# Patient Record
(demographics unavailable — no encounter records)

---

## 2024-10-18 NOTE — HISTORY OF PRESENT ILLNESS
[Parents] : parents [Normal] : Normal [Toothpaste] : Primary Fluoride Source: Toothpaste [No] : No cigarette smoke exposure [Yes] : At  exposure [Smoke Detectors] : Smoke detectors [Carbon Monoxide Detectors] : Carbon monoxide detectors [NO] : No [FreeTextEntry7] : 2 year old for well exam, tends to get intermittent eczema that flares up would like to see derm  [de-identified] : eats well [LastFluorideTreatment] : this summer

## 2024-10-18 NOTE — HISTORY OF PRESENT ILLNESS
[Parents] : parents [Normal] : Normal [Toothpaste] : Primary Fluoride Source: Toothpaste [No] : No cigarette smoke exposure [Yes] : At  exposure [Smoke Detectors] : Smoke detectors [Carbon Monoxide Detectors] : Carbon monoxide detectors [NO] : No [FreeTextEntry7] : 2 year old for well exam, tends to get intermittent eczema that flares up would like to see derm  [de-identified] : eats well [LastFluorideTreatment] : this summer

## 2024-10-18 NOTE — PHYSICAL EXAM
[Alert] : alert [No Acute Distress] : no acute distress [Normocephalic] : normocephalic [Anterior [place] Closed] : anterior fontanelle closed [Red Reflex Bilateral] : red reflex bilateral [PERRL] : PERRL [Normally Placed Ears] : normally placed ears [Auricles Well Formed] : auricles well formed [Clear Tympanic membranes with present light reflex and bony landmarks] : clear tympanic membranes with present light reflex and bony landmarks [No Discharge] : no discharge [Nares Patent] : nares patent [Palate Intact] : palate intact [Uvula Midline] : uvula midline [Tooth Eruption] : tooth eruption  [Supple, full passive range of motion] : supple, full passive range of motion [No Palpable Masses] : no palpable masses [Symmetric Chest Rise] : symmetric chest rise [Clear to Auscultation Bilaterally] : clear to auscultation bilaterally [Regular Rate and Rhythm] : regular rate and rhythm [S1, S2 present] : S1, S2 present [No Murmurs] : no murmurs [+2 Femoral Pulses] : +2 femoral pulses [Soft] : soft [NonTender] : non tender [Non Distended] : non distended [Normoactive Bowel Sounds] : normoactive bowel sounds [No Hepatomegaly] : no hepatomegaly [No Splenomegaly] : no splenomegaly [Jared 1] : Jared 1 [No Clitoromegaly] : no clitoromegaly [Normal Vaginal Introitus] : normal vaginal introitus [Patent] : patent [Normally Placed] : normally placed [No Abnormal Lymph Nodes Palpated] : no abnormal lymph nodes palpated [No Clavicular Crepitus] : no clavicular crepitus [Symmetric Buttocks Creases] : symmetric buttocks creases [No Spinal Dimple] : no spinal dimple [NoTuft of Hair] : no tuft of hair [Cranial Nerves Grossly Intact] : cranial nerves grossly intact [No Rash or Lesions] : no rash or lesions [de-identified] : dry patches

## 2024-10-18 NOTE — HISTORY OF PRESENT ILLNESS
[Parents] : parents [Normal] : Normal [Toothpaste] : Primary Fluoride Source: Toothpaste [No] : No cigarette smoke exposure [Yes] : At  exposure [Smoke Detectors] : Smoke detectors [Carbon Monoxide Detectors] : Carbon monoxide detectors [NO] : No [FreeTextEntry7] : 2 year old for well exam, tends to get intermittent eczema that flares up would like to see derm  [de-identified] : eats well [LastFluorideTreatment] : this summer

## 2024-10-18 NOTE — PHYSICAL EXAM
[Alert] : alert [No Acute Distress] : no acute distress [Normocephalic] : normocephalic [Anterior [place] Closed] : anterior fontanelle closed [Red Reflex Bilateral] : red reflex bilateral [PERRL] : PERRL [Normally Placed Ears] : normally placed ears [Auricles Well Formed] : auricles well formed [Clear Tympanic membranes with present light reflex and bony landmarks] : clear tympanic membranes with present light reflex and bony landmarks [No Discharge] : no discharge [Nares Patent] : nares patent [Palate Intact] : palate intact [Uvula Midline] : uvula midline [Tooth Eruption] : tooth eruption  [Supple, full passive range of motion] : supple, full passive range of motion [No Palpable Masses] : no palpable masses [Symmetric Chest Rise] : symmetric chest rise [Clear to Auscultation Bilaterally] : clear to auscultation bilaterally [Regular Rate and Rhythm] : regular rate and rhythm [S1, S2 present] : S1, S2 present [No Murmurs] : no murmurs [+2 Femoral Pulses] : +2 femoral pulses [Soft] : soft [NonTender] : non tender [Non Distended] : non distended [Normoactive Bowel Sounds] : normoactive bowel sounds [No Hepatomegaly] : no hepatomegaly [No Splenomegaly] : no splenomegaly [Jared 1] : Jared 1 [No Clitoromegaly] : no clitoromegaly [Normal Vaginal Introitus] : normal vaginal introitus [Patent] : patent [Normally Placed] : normally placed [No Abnormal Lymph Nodes Palpated] : no abnormal lymph nodes palpated [No Clavicular Crepitus] : no clavicular crepitus [Symmetric Buttocks Creases] : symmetric buttocks creases [No Spinal Dimple] : no spinal dimple [NoTuft of Hair] : no tuft of hair [Cranial Nerves Grossly Intact] : cranial nerves grossly intact [No Rash or Lesions] : no rash or lesions [de-identified] : dry patches

## 2024-10-18 NOTE — PHYSICAL EXAM
[Alert] : alert [No Acute Distress] : no acute distress [Normocephalic] : normocephalic [Anterior [place] Closed] : anterior fontanelle closed [Red Reflex Bilateral] : red reflex bilateral [PERRL] : PERRL [Normally Placed Ears] : normally placed ears [Auricles Well Formed] : auricles well formed [Clear Tympanic membranes with present light reflex and bony landmarks] : clear tympanic membranes with present light reflex and bony landmarks [No Discharge] : no discharge [Nares Patent] : nares patent [Palate Intact] : palate intact [Uvula Midline] : uvula midline [Tooth Eruption] : tooth eruption  [Supple, full passive range of motion] : supple, full passive range of motion [No Palpable Masses] : no palpable masses [Symmetric Chest Rise] : symmetric chest rise [Clear to Auscultation Bilaterally] : clear to auscultation bilaterally [Regular Rate and Rhythm] : regular rate and rhythm [S1, S2 present] : S1, S2 present [No Murmurs] : no murmurs [+2 Femoral Pulses] : +2 femoral pulses [Soft] : soft [NonTender] : non tender [Non Distended] : non distended [Normoactive Bowel Sounds] : normoactive bowel sounds [No Hepatomegaly] : no hepatomegaly [No Splenomegaly] : no splenomegaly [Jared 1] : Jared 1 [No Clitoromegaly] : no clitoromegaly [Normal Vaginal Introitus] : normal vaginal introitus [Patent] : patent [Normally Placed] : normally placed [No Abnormal Lymph Nodes Palpated] : no abnormal lymph nodes palpated [No Clavicular Crepitus] : no clavicular crepitus [Symmetric Buttocks Creases] : symmetric buttocks creases [No Spinal Dimple] : no spinal dimple [NoTuft of Hair] : no tuft of hair [Cranial Nerves Grossly Intact] : cranial nerves grossly intact [No Rash or Lesions] : no rash or lesions [de-identified] : dry patches

## 2024-10-18 NOTE — PLAN
[TextEntry] : lead=3.6, will f/u in 3mths for repeat lead moisturize skin with cerave, vaseline and can use hc1% cream bid to persistent patches, derm referral given

## 2024-10-18 NOTE — DEVELOPMENTAL MILESTONES
[Plays alongside other children] : plays alongside other children [Takes off some clothing] : takes off some clothing [Scoops well with spoon] : scoops well with spoon [Uses 50 words] : uses 50 words [Combine 2 words into phrase or] : combines 2 words into phrase or sentences [Follows 2-step command] : follows 2-step command [Kicks ball] : kicks ball  [Jumps off ground with 2 feet] : jumps off ground with 2 feet [Runs with coordination] : runs with coordination [Climbs up a ladder at a] : climbs up a ladder at a playground [Stacks objects] : stacks objects [Turns book pages] : turns book pages [Uses hands to turn objects] : uses hands to turn objects [FreeTextEntry1] : speaks in short phrases,  using pronouns

## 2025-02-14 NOTE — PHYSICAL EXAM
[Soft] : soft [Normal Bowel Sounds] : normal bowel sounds [NL] : warm, clear [Tender] : nontender [Distended] : nondistended [Hepatosplenomegaly] : no hepatosplenomegaly [Tenderness with Palpation] : no tenderness with palpation [de-identified] : mm moist [FreeTextEntry9] : no masses appreciated

## 2025-02-14 NOTE — PLAN
[TextEntry] : d/c mom 4-plex + for flu A,  will start tamiflu since sxs started <48hrs ago [employer]. fluids, rest.  Call if any worsening sxs.  F/u in office if fever>5 days, irritable, lethargy or dec. po with dec. u/o or any concerns

## 2025-02-14 NOTE — REVIEW OF SYSTEMS
[Fever] : fever [Fussy] : fussy [Ear Tugging] : ear tugging [Appetite Changes] : appetite changes [Vomiting] : vomiting [Abdominal Pain] : abdominal pain [Negative] : Heme/Lymph [Irritable] : no irritability [Inconsolable] : consolable [Difficulty with Sleep] : no difficulty with sleep [Eye Discharge] : no eye discharge [Nasal Congestion] : no nasal congestion [Sore Throat] : no sore throat [Diarrhea] : no diarrhea

## 2025-02-14 NOTE — HISTORY OF PRESENT ILLNESS
[de-identified] : fever yesterday of 100.6 today no fever but has been cranky, vomited 3X yesterday but  no emesis since the afternoon, drinking well, urinating well, c/o intermittent belly pain yesterday but seems ok today. c/o ear pain today, + exposures to other sick kids in past few weeks. eating less but drinking well,

## 2025-02-14 NOTE — HISTORY OF PRESENT ILLNESS
[de-identified] : fever yesterday of 100.6 today no fever but has been cranky, vomited 3X yesterday but  no emesis since the afternoon, drinking well, urinating well, c/o intermittent belly pain yesterday but seems ok today. c/o ear pain today, + exposures to other sick kids in past few weeks. eating less but drinking well,

## 2025-02-14 NOTE — PHYSICAL EXAM
[Soft] : soft [Normal Bowel Sounds] : normal bowel sounds [NL] : warm, clear [Tender] : nontender [Distended] : nondistended [Hepatosplenomegaly] : no hepatosplenomegaly [Tenderness with Palpation] : no tenderness with palpation [de-identified] : mm moist [FreeTextEntry9] : no masses appreciated

## 2025-05-30 NOTE — PHYSICAL EXAM
[Erythematous Oropharynx] : erythematous oropharynx [Vesicles] : vesicles present [NL] : warm, clear [Erythema] : erythema [FreeTextEntry3] : left TM appears partially erythematous [de-identified] : minor bleeding from the right buccal mucosa (due to accidental biting while swabbing)

## 2025-05-30 NOTE — HISTORY OF PRESENT ILLNESS
[de-identified] : fever [FreeTextEntry6] : - p/w fever 101-104 since yesterday, a/w appetite loss, tummy pain - denies cough runny nose emesis diarrhea decreased UOP

## 2025-05-30 NOTE — DISCUSSION/SUMMARY
[FreeTextEntry1] : # viral illness - strep-, 4 plex- : throat culture and RVP will be sent out - supportive care: fever control (alternating acetaminophen and ibuprofen every 3 hours as needed), hydration, soft diet, and so on - RTC for persistent fever, poor oral intake, decreased UOP, severe abdominal pain, blood in stool, lethargy, and/or any new concerns